# Patient Record
Sex: MALE | Race: WHITE | HISPANIC OR LATINO | Employment: FULL TIME | URBAN - METROPOLITAN AREA
[De-identification: names, ages, dates, MRNs, and addresses within clinical notes are randomized per-mention and may not be internally consistent; named-entity substitution may affect disease eponyms.]

---

## 2022-01-06 ENCOUNTER — LAB VISIT (OUTPATIENT)
Dept: PRIMARY CARE CLINIC | Facility: CLINIC | Age: 32
End: 2022-01-06
Payer: COMMERCIAL

## 2022-01-06 DIAGNOSIS — Z20.822 CONTACT WITH AND (SUSPECTED) EXPOSURE TO COVID-19: ICD-10-CM

## 2022-01-06 LAB
CTP QC/QA: YES
SARS-COV-2 AG RESP QL IA.RAPID: POSITIVE

## 2022-01-06 PROCEDURE — 87811 SARS-COV-2 COVID19 W/OPTIC: CPT

## 2022-01-14 ENCOUNTER — LAB VISIT (OUTPATIENT)
Dept: PRIMARY CARE CLINIC | Facility: CLINIC | Age: 32
End: 2022-01-14
Payer: COMMERCIAL

## 2022-01-14 DIAGNOSIS — Z20.822 CONTACT WITH AND (SUSPECTED) EXPOSURE TO COVID-19: ICD-10-CM

## 2022-01-14 LAB
CTP QC/QA: YES
SARS-COV-2 AG RESP QL IA.RAPID: NEGATIVE

## 2022-01-14 PROCEDURE — 87811 SARS-COV-2 COVID19 W/OPTIC: CPT

## 2024-03-18 ENCOUNTER — LAB VISIT (OUTPATIENT)
Dept: LAB | Facility: HOSPITAL | Age: 34
End: 2024-03-18
Payer: COMMERCIAL

## 2024-03-18 ENCOUNTER — OFFICE VISIT (OUTPATIENT)
Dept: INTERNAL MEDICINE | Facility: CLINIC | Age: 34
End: 2024-03-18
Payer: COMMERCIAL

## 2024-03-18 VITALS
SYSTOLIC BLOOD PRESSURE: 129 MMHG | WEIGHT: 218.25 LBS | HEIGHT: 74 IN | HEART RATE: 78 BPM | BODY MASS INDEX: 28.01 KG/M2 | DIASTOLIC BLOOD PRESSURE: 78 MMHG

## 2024-03-18 DIAGNOSIS — Z72.51 HIGH RISK SEXUAL BEHAVIOR, UNSPECIFIED TYPE: ICD-10-CM

## 2024-03-18 DIAGNOSIS — N48.9 PENILE LESION: Primary | ICD-10-CM

## 2024-03-18 LAB
HCV AB SERPL QL IA: NORMAL
HIV 1+2 AB+HIV1 P24 AG SERPL QL IA: NORMAL

## 2024-03-18 PROCEDURE — 99203 OFFICE O/P NEW LOW 30 MIN: CPT | Mod: S$GLB,,,

## 2024-03-18 PROCEDURE — 99999 PR PBB SHADOW E&M-EST. PATIENT-LVL III: CPT | Mod: PBBFAC,,,

## 2024-03-18 PROCEDURE — 86592 SYPHILIS TEST NON-TREP QUAL: CPT

## 2024-03-18 PROCEDURE — 3008F BODY MASS INDEX DOCD: CPT | Mod: CPTII,S$GLB,,

## 2024-03-18 PROCEDURE — 86803 HEPATITIS C AB TEST: CPT

## 2024-03-18 PROCEDURE — 87389 HIV-1 AG W/HIV-1&-2 AB AG IA: CPT

## 2024-03-18 PROCEDURE — 36415 COLL VENOUS BLD VENIPUNCTURE: CPT

## 2024-03-18 PROCEDURE — 3078F DIAST BP <80 MM HG: CPT | Mod: CPTII,S$GLB,,

## 2024-03-18 PROCEDURE — 1159F MED LIST DOCD IN RCRD: CPT | Mod: CPTII,S$GLB,,

## 2024-03-18 PROCEDURE — 3074F SYST BP LT 130 MM HG: CPT | Mod: CPTII,S$GLB,,

## 2024-03-18 PROCEDURE — 87491 CHLMYD TRACH DNA AMP PROBE: CPT

## 2024-03-18 PROCEDURE — 1160F RVW MEDS BY RX/DR IN RCRD: CPT | Mod: CPTII,S$GLB,,

## 2024-03-18 NOTE — PROGRESS NOTES
"Jose E Barajas Jr.  1990    Subjective:     Chief Complaint: penile lesions    History of Present Illness:  Mr. Jose E Barajas Jr. is a 33 y.o. male who presents to clinic for penile lesions. New patient.  Here from [place] visiting family  Flying back next week    "Spots" in groin area  - Bumps start October 2023  - 3-4 bumps  - Have not changed in nuber size or color since October  - No discharge, no itching or pain    - Patient had unprotected intercourse with an ex girlfriend who admitting to having HPV, a long time ago  - Oral intercourse in October 2023  - Has not had vaginal intercourse in 2 years  - Not vaccinated for HPV  - Now seeing a new partner and wants to be sure everything will be ok    Previously was tested for various STDs in [place] and all negative      Review of Systems   Constitutional: Negative.  Negative for chills and fever.   Genitourinary:  Negative for dysuria and urgency.        Penile lesions   All other systems reviewed and are negative.       PMH:   History reviewed. No pertinent past medical history.  No past surgical history on file.  Allergies:   Review of patient's allergies indicates:  No Known Allergies  Medications:     No current outpatient medications on file prior to visit.     No current facility-administered medications on file prior to visit.     Social History:     Social History     Tobacco Use    Smoking status: Not on file    Smokeless tobacco: Not on file   Substance Use Topics    Alcohol use: Not on file     Family History:   No family history on file.  Physical Exam:   /78   Pulse 78   Ht 6' 1.5" (1.867 m)   Wt 99 kg (218 lb 4.1 oz)   BMI 28.40 kg/m²      Body mass index is 28.4 kg/m².     Physical Exam  Vitals reviewed.   Constitutional:       Appearance: He is not ill-appearing.   Genitourinary:     Testes: Normal.      Comments: 4-5 Small, 1-2mm sized bumps over the penile shaft. Nontender to touch, no discharge. One bump is more red in " "appearance. No lesions on thighs nor scrotum.   Skin:     General: Skin is warm and dry.        Laboratory  No results found for: "WBC", "HGB", "HCT", "MCV", "PLT"  No results found for: "GLU", "NA", "K", "CL", "CO2", "BUN", "CREATININE", "CALCIUM", "MG"  No results found for: "INR", "PROTIME"  No results found for: "HGBA1C"  No results for input(s): "POCTGLUCOSE" in the last 72 hours.    Health Maintenance         Date Due Completion Date    Hepatitis C Screening Never done ---    Lipid Panel Never done ---    HIV Screening Never done ---    TETANUS VACCINE 06/12/2018 6/12/2008    COVID-19 Vaccine (4 - 2023-24 season) 09/01/2023 11/22/2021          Assessment & Plan:     1. Penile lesion        2. High risk sexual behavior, unspecified type  Hepatitis C Antibody    C. trachomatis/N. gonorrhoeae by AMP DNA [facility]; Urine    HIV 1/2 Ag/Ab (4th Gen)    RPR          Jose E Barajas presented today due to concern for penile lesions.     On exam these lesions are very small and given his history of then being unchanged since October, do not seem consistent with HPV (genital warts), herpes, nor molluscum.   Most likely these are [place] spots and blocked sebaceous glands.   Recommended good groin area hygiene, wearing boxers and breathable underwear instead of briefs  Recommended getting the Gardasil vaccine course when he gets a chance, likely back in [place]  Broad STD testing per patient request  If lesions worsen pt can follow up with a dermatologist in [place]    Discussed with Staff: Dr. Hernán Floyd MD  Internal Medicine PGY-3  [facility]  [address] [place] [address]  Phone: [phone]  Fax: [phone]    Follow-up:   No future appointments.    "

## 2024-03-18 NOTE — PATIENT INSTRUCTIONS
"Penile lesion look like "Ezra spots" - blocked sebaceous glands  - Good hygiene of the genital area  - Try wearing boxers instead of tight briefs  - Overall not concerning and not contagious    Hand lesions  - If they grow more or become concerning to you, visit a dermatologist in [place]  - Palmar warts can be frozen off, other creams may also be useful and less painful    STD testing today  "

## 2024-03-19 LAB
C TRACH DNA SPEC QL NAA+PROBE: NOT DETECTED
N GONORRHOEA DNA SPEC QL NAA+PROBE: NOT DETECTED
RPR SER QL: NORMAL

## 2024-03-19 NOTE — PROGRESS NOTES
I have reviewed the notes, assessments, and/or procedures performed by Dr Floyd, I concur with her/his documentation of Jose E Barajas Jr..  Date of Service: 3/18/2024    I personally examined the patient and answered all questions related to penile lesions. Characteristic is typical for franck nodules which are sweat glands without a hair follicle. They pose no increased threat or risk of infection and are considered to be on the spectrum of normal.